# Patient Record
Sex: FEMALE | Race: WHITE | NOT HISPANIC OR LATINO
[De-identification: names, ages, dates, MRNs, and addresses within clinical notes are randomized per-mention and may not be internally consistent; named-entity substitution may affect disease eponyms.]

---

## 2018-07-06 ENCOUNTER — APPOINTMENT (OUTPATIENT)
Dept: HEART AND VASCULAR | Facility: CLINIC | Age: 83
End: 2018-07-06
Payer: MEDICARE

## 2018-07-06 ENCOUNTER — LABORATORY RESULT (OUTPATIENT)
Age: 83
End: 2018-07-06

## 2018-07-06 DIAGNOSIS — Z00.00 ENCOUNTER FOR GENERAL ADULT MEDICAL EXAMINATION W/OUT ABNORMAL FINDINGS: ICD-10-CM

## 2018-07-06 PROCEDURE — ZZZZZ: CPT

## 2018-07-10 LAB
APPEARANCE: ABNORMAL
BACTERIA UR CULT: NORMAL
BILIRUBIN URINE: NEGATIVE
BLOOD URINE: NEGATIVE
COLOR: YELLOW
GLUCOSE QUALITATIVE U: NEGATIVE MG/DL
KETONES URINE: NEGATIVE
LEUKOCYTE ESTERASE URINE: NEGATIVE
NITRITE URINE: NEGATIVE
PH URINE: >8.5
PROTEIN URINE: 30 MG/DL
SPECIFIC GRAVITY URINE: 1.01
UROBILINOGEN URINE: NEGATIVE MG/DL

## 2018-09-13 ENCOUNTER — RX RENEWAL (OUTPATIENT)
Age: 83
End: 2018-09-13

## 2018-09-13 DIAGNOSIS — F41.9 ANXIETY DISORDER, UNSPECIFIED: ICD-10-CM

## 2018-09-17 ENCOUNTER — APPOINTMENT (OUTPATIENT)
Dept: HEART AND VASCULAR | Facility: CLINIC | Age: 83
End: 2018-09-17
Payer: MEDICARE

## 2018-09-17 VITALS — RESPIRATION RATE: 12 BRPM | DIASTOLIC BLOOD PRESSURE: 86 MMHG | SYSTOLIC BLOOD PRESSURE: 146 MMHG | HEART RATE: 78 BPM

## 2018-09-17 DIAGNOSIS — Z86.69 PERSONAL HISTORY OF OTHER DISEASES OF THE NERVOUS SYSTEM AND SENSE ORGANS: ICD-10-CM

## 2018-09-17 DIAGNOSIS — R01.1 CARDIAC MURMUR, UNSPECIFIED: ICD-10-CM

## 2018-09-17 DIAGNOSIS — F41.1 GENERALIZED ANXIETY DISORDER: ICD-10-CM

## 2018-09-17 DIAGNOSIS — Z86.73 PERSONAL HISTORY OF TRANSIENT ISCHEMIC ATTACK (TIA), AND CEREBRAL INFARCTION W/OUT RESIDUAL DEFICITS: ICD-10-CM

## 2018-09-17 DIAGNOSIS — R09.89 OTHER SPECIFIED SYMPTOMS AND SIGNS INVOLVING THE CIRCULATORY AND RESPIRATORY SYSTEMS: ICD-10-CM

## 2018-09-17 DIAGNOSIS — Z87.898 PERSONAL HISTORY OF OTHER SPECIFIED CONDITIONS: ICD-10-CM

## 2018-09-17 DIAGNOSIS — Z78.9 OTHER SPECIFIED HEALTH STATUS: ICD-10-CM

## 2018-09-17 PROCEDURE — 99214 OFFICE O/P EST MOD 30 MIN: CPT

## 2018-09-17 PROCEDURE — 93306 TTE W/DOPPLER COMPLETE: CPT

## 2018-09-17 PROCEDURE — 93000 ELECTROCARDIOGRAM COMPLETE: CPT

## 2018-09-17 PROCEDURE — 93880 EXTRACRANIAL BILAT STUDY: CPT

## 2018-09-17 RX ORDER — FAMOTIDINE 40 MG/1
40 TABLET, FILM COATED ORAL
Refills: 0 | Status: ACTIVE | COMMUNITY

## 2018-09-18 LAB
25(OH)D3 SERPL-MCNC: 20.7 NG/ML
ALBUMIN SERPL ELPH-MCNC: 4.4 G/DL
ALP BLD-CCNC: 115 U/L
ALT SERPL-CCNC: 10 U/L
ANION GAP SERPL CALC-SCNC: 27 MMOL/L
AST SERPL-CCNC: 16 U/L
BASOPHILS # BLD AUTO: 0.01 K/UL
BASOPHILS NFR BLD AUTO: 0.1 %
BILIRUB SERPL-MCNC: 0.3 MG/DL
BUN SERPL-MCNC: 14 MG/DL
CALCIUM SERPL-MCNC: 9.5 MG/DL
CHLORIDE SERPL-SCNC: 94 MMOL/L
CHOLEST SERPL-MCNC: 300 MG/DL
CHOLEST/HDLC SERPL: 3.4 RATIO
CO2 SERPL-SCNC: 17 MMOL/L
CREAT SERPL-MCNC: 0.76 MG/DL
EOSINOPHIL # BLD AUTO: 0.05 K/UL
EOSINOPHIL NFR BLD AUTO: 0.5 %
FOLATE SERPL-MCNC: 15.9 NG/ML
GLUCOSE SERPL-MCNC: 99 MG/DL
HBA1C MFR BLD HPLC: 5.9 %
HCT VFR BLD CALC: 40.8 %
HDLC SERPL-MCNC: 88 MG/DL
HGB BLD-MCNC: 12.7 G/DL
IMM GRANULOCYTES NFR BLD AUTO: 0.1 %
LDLC SERPL CALC-MCNC: 195 MG/DL
LYMPHOCYTES # BLD AUTO: 1.44 K/UL
LYMPHOCYTES NFR BLD AUTO: 15.5 %
MAGNESIUM SERPL-MCNC: 2.1 MG/DL
MAN DIFF?: NORMAL
MCHC RBC-ENTMCNC: 29.1 PG
MCHC RBC-ENTMCNC: 31.1 GM/DL
MCV RBC AUTO: 93.6 FL
MONOCYTES # BLD AUTO: 0.55 K/UL
MONOCYTES NFR BLD AUTO: 5.9 %
NEUTROPHILS # BLD AUTO: 7.23 K/UL
NEUTROPHILS NFR BLD AUTO: 77.9 %
NT-PROBNP SERPL-MCNC: 768 PG/ML
PHOSPHATE SERPL-MCNC: 3.6 MG/DL
PLATELET # BLD AUTO: 216 K/UL
POTASSIUM SERPL-SCNC: 4.9 MMOL/L
PROT SERPL-MCNC: 7.4 G/DL
PSA SERPL-MCNC: <0.01 NG/ML
RBC # BLD: 4.36 M/UL
RBC # FLD: 14.8 %
SODIUM SERPL-SCNC: 138 MMOL/L
T3FREE SERPL-MCNC: 3.53 PG/ML
T4 FREE SERPL-MCNC: 1.2 NG/DL
T4 SERPL-MCNC: 7.5 UG/DL
TRIGL SERPL-MCNC: 85 MG/DL
TSH SERPL-ACNC: 0.28 UIU/ML
VIT B12 SERPL-MCNC: 533 PG/ML
WBC # FLD AUTO: 9.29 K/UL

## 2018-09-20 LAB
M TB IFN-G BLD-IMP: NEGATIVE
QUANTIFERON TB PLUS MITOGEN MINUS NIL: >10 IU/ML
QUANTIFERON TB PLUS NIL: 0.08 IU/ML
QUANTIFERON TB PLUS TB1 MINUS NIL: 0.02 IU/ML
QUANTIFERON TB PLUS TB2 MINUS NIL: 0.02 IU/ML

## 2018-10-30 ENCOUNTER — RX RENEWAL (OUTPATIENT)
Age: 83
End: 2018-10-30

## 2018-12-11 ENCOUNTER — RX RENEWAL (OUTPATIENT)
Age: 83
End: 2018-12-11

## 2019-02-01 ENCOUNTER — RX RENEWAL (OUTPATIENT)
Age: 84
End: 2019-02-01

## 2019-03-06 ENCOUNTER — RX RENEWAL (OUTPATIENT)
Age: 84
End: 2019-03-06

## 2019-03-06 DIAGNOSIS — E03.9 HYPOTHYROIDISM, UNSPECIFIED: ICD-10-CM

## 2019-04-25 ENCOUNTER — RX RENEWAL (OUTPATIENT)
Age: 84
End: 2019-04-25

## 2019-04-30 ENCOUNTER — RX CHANGE (OUTPATIENT)
Age: 84
End: 2019-04-30

## 2019-04-30 RX ORDER — THYROID, PORCINE 90 MG/1
90 TABLET ORAL DAILY
Qty: 90 | Refills: 3 | Status: DISCONTINUED | COMMUNITY
Start: 2019-04-30 | End: 2019-04-30

## 2019-04-30 RX ORDER — THYROID, PORCINE 90 MG/1
90 TABLET ORAL DAILY
Qty: 90 | Refills: 3 | Status: ACTIVE | COMMUNITY
Start: 2019-04-30 | End: 1900-01-01

## 2019-06-06 ENCOUNTER — RX RENEWAL (OUTPATIENT)
Age: 84
End: 2019-06-06

## 2019-06-06 RX ORDER — DIAZEPAM 2 MG/1
2 TABLET ORAL
Qty: 120 | Refills: 0 | Status: ACTIVE | COMMUNITY
Start: 2018-09-13 | End: 1900-01-01

## 2019-12-04 ENCOUNTER — APPOINTMENT (OUTPATIENT)
Dept: HEART AND VASCULAR | Facility: CLINIC | Age: 84
End: 2019-12-04
Payer: MEDICARE

## 2019-12-04 ENCOUNTER — NON-APPOINTMENT (OUTPATIENT)
Age: 84
End: 2019-12-04

## 2019-12-04 VITALS
RESPIRATION RATE: 15 BRPM | HEART RATE: 68 BPM | SYSTOLIC BLOOD PRESSURE: 140 MMHG | BODY MASS INDEX: 22.38 KG/M2 | WEIGHT: 114 LBS | DIASTOLIC BLOOD PRESSURE: 90 MMHG | HEIGHT: 60 IN

## 2019-12-04 DIAGNOSIS — R06.02 SHORTNESS OF BREATH: ICD-10-CM

## 2019-12-04 DIAGNOSIS — Z86.39 PERSONAL HISTORY OF OTHER ENDOCRINE, NUTRITIONAL AND METABOLIC DISEASE: ICD-10-CM

## 2019-12-04 DIAGNOSIS — Z86.79 PERSONAL HISTORY OF OTHER DISEASES OF THE CIRCULATORY SYSTEM: ICD-10-CM

## 2019-12-04 DIAGNOSIS — I35.0 NONRHEUMATIC AORTIC (VALVE) STENOSIS: ICD-10-CM

## 2019-12-04 PROCEDURE — 93000 ELECTROCARDIOGRAM COMPLETE: CPT

## 2019-12-04 PROCEDURE — 99215 OFFICE O/P EST HI 40 MIN: CPT

## 2019-12-09 PROBLEM — I35.0 AORTIC STENOSIS, SEVERE: Status: ACTIVE | Noted: 2019-12-04

## 2019-12-09 NOTE — PHYSICAL EXAM
[General Appearance - Well Developed] : well developed [Normal Appearance] : normal appearance [Well Groomed] : well groomed [General Appearance - Well Nourished] : well nourished [No Deformities] : no deformities [Normal Conjunctiva] : the conjunctiva exhibited no abnormalities [General Appearance - In No Acute Distress] : no acute distress [Eyelids - No Xanthelasma] : the eyelids demonstrated no xanthelasmas [No Oral Pallor] : no oral pallor [Normal Oral Mucosa] : normal oral mucosa [No Oral Cyanosis] : no oral cyanosis [Normal Jugular Venous V Waves Present] : normal jugular venous V waves present [Exaggerated Use Of Accessory Muscles For Inspiration] : no accessory muscle use [Auscultation Breath Sounds / Voice Sounds] : lungs were clear to auscultation bilaterally [Respiration, Rhythm And Depth] : normal respiratory rhythm and effort [Heart Sounds] : normal S1 and S2 [Heart Rate And Rhythm] : heart rate and rhythm were normal [Abdomen Soft] : soft [Abdomen Tenderness] : non-tender [Abdomen Mass (___ Cm)] : no abdominal mass palpated [Nail Clubbing] : no clubbing of the fingernails [] : no ischemic changes [Cyanosis, Localized] : no localized cyanosis [Petechial Hemorrhages (___cm)] : no petechial hemorrhages [FreeTextEntry1] : apical systolic murmur, diastolic murmur at the lower left sternal border, systolic murmur at the 2nd ICS, LSB

## 2019-12-09 NOTE — HISTORY OF PRESENT ILLNESS
[FreeTextEntry1] : The patient fractured her right hip.  Was taken to [facility].  They were reluctant to do surgery due to AS.  Prior to surgery, she had aortic valvuloplasty.  She has just recently been released from SNF & is in assisted living facility.  Now [facility] wants to consider TAVR.  Here for 2nd opinion.  She denies chest pain, dizziness or syncope or SOLORZANO.  In wheelchair.

## 2019-12-09 NOTE — DISCUSSION/SUMMARY
[FreeTextEntry1] : Long discussion with patient & daughter.  As she is currently asymptomatic from a cardiac standpoint, there is no urgency to consider TAVR.  She is to consult with Dr rodas , a cardiologist on [facility], & get an echocardiogram to assess her AS.  Then, depending upon the results of her echo , as well as symptoms, they can determine if & when  TAVR may be indicated.  Maintain on curent medical regimen for now.

## 2019-12-09 NOTE — REVIEW OF SYSTEMS
[Feeling Fatigued] : feeling fatigued [Heartburn] : heartburn [Eyeglasses] : currently wearing eyeglasses [Hip Pain] : hip pain [Joint Pain] : joint pain [Incontinence] : incontinence [Knee Problem] : knee problems [Knee Pain] : knee pain [Memory Lapses Or Loss] : memory lapses or loss [Anxiety] : anxiety [Under Stress] : under stress [Negative] : ENT [Headache] : no headache [Blurry Vision] : no blurred vision [Seeing Double (Diplopia)] : no diplopia [Abdominal Pain] : no abdominal pain [Dysphagia] : no dysphagia

## 2022-04-09 ENCOUNTER — APPOINTMENT (OUTPATIENT)
Dept: ORTHOPEDIC SURGERY | Facility: CLINIC | Age: 87
End: 2022-04-09
Payer: MEDICARE

## 2022-04-09 VITALS — WEIGHT: 114 LBS | BODY MASS INDEX: 19.46 KG/M2 | HEIGHT: 64 IN

## 2022-04-09 PROCEDURE — 99213 OFFICE O/P EST LOW 20 MIN: CPT | Mod: 25

## 2022-04-09 PROCEDURE — 20610 DRAIN/INJ JOINT/BURSA W/O US: CPT

## 2022-04-09 PROCEDURE — J3490M: CUSTOM

## 2022-04-09 NOTE — PHYSICAL EXAM
[Bilateral] : knee bilaterally [FreeTextEntry3] : mild effusion, global tenderness.\par ROM 0-100\par MJL, LJL tenderness\par ligament stable\par using wheelchair

## 2022-04-09 NOTE — PROCEDURE
[FreeTextEntry3] : Injection Procedure Note:\par \par Patient Identification\par Name/: Verbal with patient and/or family\par \par Procedure Verification:\par Procedure confirmed with patient or family/designee\par Consent for procedure: Verbal Consent Given\par Relevant documentation completed, reviewed, and signed\par Clinical indications for procedure confirmed\par \par \par \par Time-out with all members of procedure team immediately prior to procedure:\par Correct patient identified. Agreement on procedure. Correct side and site.\par \par \par \par KNEE INJECTION (STEROID) - LEFT\par After verbal consent and identification of the correct patient and correct site, the superolateral left knee was prepped using alcohol swabs and betadine. This was allowed time to air dry. A mixture of 1cc DepoMedrol 40mg/ml, 3cc Lidocaine 1%, and 3cc Bupivacaine 0.5% was injected into the suprapatellar pouch using a sterile 22G needle after ethyl chloride spray for skin anesthesia. The patient tolerated the procedure well. After-care instructions were provided and included instructions to ice the area and to call if redness, pain, or fever develop.\par \par Injection Procedure Note:\par  \par Patient Identification\par Name/: Verbal with patient and/or family\par  \par Procedure Verification:\par Procedure confirmed with patient or family/designee\par Consent for procedure: Verbal Consent Given\par Relevant documentation completed, reviewed, and signed\par Clinical indications for procedure confirmed\par \par  \par \par Time-out with all members of procedure team immediately prior to procedure:\par Correct patient identified. Agreement on procedure. Correct side and site.\par \par  \par \par KNEE INJECTION (STEROID) - RIGHT\par After verbal consent and identification of the correct patient and correct site, the superolateral right knee was prepped using alcohol swabs and betadine. This was allowed time to air dry. A mixture of 1cc DepoMedrol 40mg/ml, 3cc Lidocaine 1%, and 3cc Bupivacaine 0.5% was injected into the suprapatellar pouch using a sterile 22G needle after ethyl chloride spray for skin anesthesia. The patient tolerated the procedure well. After-care instructions were provided and included instructions to ice the area and to call if redness, pain, or fever develop.\par

## 2022-04-09 NOTE — HISTORY OF PRESENT ILLNESS
[Gradual] : gradual [10] : 10 [Dull/Aching] : dull/aching [Localized] : localized [Constant] : constant [Standing] : standing [Walking] : walking [Stairs] : stairs [Retired] : Work status: retired [de-identified] : B knee follow up. Hx Knee OA. Treated with CSI in the past. Last one over 3 months ago. No new falls. Using a wheelchair\par \par  [] : no [FreeTextEntry1] : liat knees  [FreeTextEntry5] : patient is here with knee pain since for many years \par patient has been getting cortisone injections every three years

## 2022-04-11 ENCOUNTER — APPOINTMENT (OUTPATIENT)
Dept: ORTHOPEDIC SURGERY | Facility: CLINIC | Age: 87
End: 2022-04-11

## 2022-07-11 ENCOUNTER — APPOINTMENT (OUTPATIENT)
Dept: ORTHOPEDIC SURGERY | Facility: CLINIC | Age: 87
End: 2022-07-11

## 2022-07-11 PROCEDURE — J3490M: CUSTOM

## 2022-07-11 PROCEDURE — 20610 DRAIN/INJ JOINT/BURSA W/O US: CPT | Mod: 50

## 2022-07-11 PROCEDURE — 99213 OFFICE O/P EST LOW 20 MIN: CPT | Mod: 25

## 2022-07-11 NOTE — IMAGING
[de-identified] : Bilateral knees with mild effusion.\par Examination is limited as pt is in a wheelchair.\par There is no ligamentous laxity to either knee.\par There is lateral jointline ttp symmetrically.\par BLEs are nvi with 4/5 strength.

## 2022-07-11 NOTE — HISTORY OF PRESENT ILLNESS
[de-identified] : 7/11/2022: Pt here for bilateral knee CSI's, Ms. Dewitt has had previous Viscosupplementation injections with no pain relief and she states she requires a cortisone injection every 3 months for pain relief.\par Ms. Dewitt declines xrays today. \par 12/6/21: Continued pain to right knee, and reports pain has been worsening. Hx of relief with csi\par in past. \par 8/16/21: pt having persistent knee pain. pt is in wheelchair. \par 8/09/21: bilateral knee pain persists- pt is interested in repeat csi's today\par 4/19/21:Pt here for f/u of bilateral knee osteoarthritis. Pt is requesting bilateral knee CSIs today.\par 9/14/2020: Pt here for f/u of bilateral knee osteoarthritis. Pt is requesting bilateral knee CSI' today. \par 5/21/20: unclear whether either injection helped. \par 2/13/2020: Pt. here for Orthovisc injection #4 to the bilateral knees. Pt. reports mild pain relief. \par 2/6/2020: Pt. here for Orthovisc injection #3 to the bilateral knees. \par 1/30/20: injections with min change in the knees\par 1/23/2020: Pt. here for the first of four injections of Orthovisc to the bilateral knees for treatment of knee OA. \par 12/3/19: fell and broke her hip in August- she went to [facility] and was told she couldn't have surgery because she\par needed a valvoplasty- was sent to [facility] and had the valvoplasty and hip surgery there. her knees were badly\par affected by the injury/fall and therefore couldn't walk- went to rehab for 3 mos. was walking 90 feet with a walker and\par is walking, but not the way she used to walk. also has a 24h aide. here for knee injections. hasn't followed up for the\par hip at all. \par 7/11/2019: Pt. here for f/u of bilateral knee OA.\par 4/11/19 b/l knee, R>L\par 12/20/18 bilateral knee pain and swelling doing PT/OT helps started after a TIA 1 year ago

## 2022-07-11 NOTE — ASSESSMENT
[FreeTextEntry1] : Pt provided bilateral knee CSI.\par RTO in 3 mos for repeat cortisone injections.\par Pt will rto prn.

## 2022-07-11 NOTE — PROCEDURE
[Large Joint Injection] : Large joint injection [Bilateral] : bilaterally of the [Knee] : knee [Pain] : pain [Inflammation] : inflammation [X-ray evidence of Osteoarthritis on this or prior visit] : x-ray evidence of Osteoarthritis on this or prior visit [Alcohol] : alcohol [Ethyl Chloride sprayed topically] : ethyl chloride sprayed topically [Sterile technique used] : sterile technique used [___ cc    1%] : Lidocaine ~Vcc of 1%  [___ cc    0.25%] : Bupivacaine (Marcaine) ~Vcc of 0.25%  [___ cc    40mg] : Triamcinolone (Kenalog) ~Vcc of 40 mg  [] : Patient tolerated procedure well [Call if redness, pain or fever occur] : call if redness, pain or fever occur [Apply ice for 15min out of every hour for the next 12-24 hours as tolerated] : apply ice for 15 minutes out of every hour for the next 12-24 hours as tolerated [Previous OTC use and PT nontherapeutic] : patient has tried OTC's including aspirin, Ibuprofen, Aleve, etc or prescription NSAIDS, and/or exercises at home and/or physical therapy without satisfactory response [Patient had decreased mobility in the joint] : patient had decreased mobility in the joint [Risks, benefits, alternatives discussed / Verbal consent obtained] : the risks benefits, and alternatives have been discussed, and verbal consent was obtained [de-identified] : Bilateral knee OA.

## 2022-10-13 ENCOUNTER — APPOINTMENT (OUTPATIENT)
Dept: ORTHOPEDIC SURGERY | Facility: CLINIC | Age: 87
End: 2022-10-13

## 2022-10-13 VITALS — WEIGHT: 114 LBS | BODY MASS INDEX: 19.46 KG/M2 | HEIGHT: 64 IN

## 2022-10-13 PROCEDURE — 99213 OFFICE O/P EST LOW 20 MIN: CPT | Mod: 25

## 2022-10-13 PROCEDURE — 20610 DRAIN/INJ JOINT/BURSA W/O US: CPT | Mod: 50

## 2022-10-13 NOTE — HISTORY OF PRESENT ILLNESS
[de-identified] : 10/13/2022: Pt here for f/u of bilateral chronic knee OA. \par pt notes mild pain relief s/p CSIs and states she is not a candidate for TKAs due to "heart issues".\par \par 7/11/2022: Pt here for bilateral knee CSI's, Ms. Dewitt has had previous Viscosupplementation injections with no pain relief and she states she requires a cortisone injection every 3 months for pain relief.\par Ms. Dewitt declines xrays today. \par 12/6/21: Continued pain to right knee, and reports pain has been worsening. Hx of relief with csi\par in past. \par 8/16/21: pt having persistent knee pain. pt is in wheelchair. \par 8/09/21: bilateral knee pain persists- pt is interested in repeat csi's today\par 4/19/21:Pt here for f/u of bilateral knee osteoarthritis. Pt is requesting bilateral knee CSIs today.\par 9/14/2020: Pt here for f/u of bilateral knee osteoarthritis. Pt is requesting bilateral knee CSI' today. \par 5/21/20: unclear whether either injection helped. \par 2/13/2020: Pt. here for Orthovisc injection #4 to the bilateral knees. Pt. reports mild pain relief. \par 2/6/2020: Pt. here for Orthovisc injection #3 to the bilateral knees. \par 1/30/20: injections with min change in the knees\par 1/23/2020: Pt. here for the first of four injections of Orthovisc to the bilateral knees for treatment of knee OA. \par 12/3/19: fell and broke her hip in August- she went to [facility] and was told she couldn't have surgery because she\par needed a valvoplasty- was sent to [facility] and had the valvoplasty and hip surgery there. her knees were badly\par affected by the injury/fall and therefore couldn't walk- went to rehab for 3 mos. was walking 90 feet with a walker and\par is walking, but not the way she used to walk. also has a 24h aide. here for knee injections. hasn't followed up for the\par hip at all. \par 7/11/2019: Pt. here for f/u of bilateral knee OA.\par 4/11/19 b/l knee, R>L\par 12/20/18 bilateral knee pain and swelling doing PT/OT helps started after a TIA 1 year ago

## 2022-10-13 NOTE — IMAGING
[de-identified] : Bilateral knees with mild effusion.\par Examination is limited as pt is in a wheelchair.\par There is no ligamentous laxity to either knee.\par There is lateral jointline ttp symmetrically.\par BLEs are nvi with 4/5 strength.

## 2022-10-13 NOTE — PROCEDURE
[Large Joint Injection] : Large joint injection [Bilateral] : bilaterally of the [Knee] : knee [Pain] : pain [Inflammation] : inflammation [X-ray evidence of Osteoarthritis on this or prior visit] : x-ray evidence of Osteoarthritis on this or prior visit [Alcohol] : alcohol [Ethyl Chloride sprayed topically] : ethyl chloride sprayed topically [Sterile technique used] : sterile technique used [___ cc    1%] : Lidocaine ~Vcc of 1%  [___ cc    0.25%] : Bupivacaine (Marcaine) ~Vcc of 0.25%  [___ cc    40mg] : Triamcinolone (Kenalog) ~Vcc of 40 mg  [] : Patient tolerated procedure well [Call if redness, pain or fever occur] : call if redness, pain or fever occur [Apply ice for 15min out of every hour for the next 12-24 hours as tolerated] : apply ice for 15 minutes out of every hour for the next 12-24 hours as tolerated [Previous OTC use and PT nontherapeutic] : patient has tried OTC's including aspirin, Ibuprofen, Aleve, etc or prescription NSAIDS, and/or exercises at home and/or physical therapy without satisfactory response [Patient had decreased mobility in the joint] : patient had decreased mobility in the joint [Risks, benefits, alternatives discussed / Verbal consent obtained] : the risks benefits, and alternatives have been discussed, and verbal consent was obtained [de-identified] : Bilateral knee OA.

## 2022-10-13 NOTE — ASSESSMENT
[FreeTextEntry1] : Pt provided bilateral knee CSI.\par Pt will consider RTO in 3 mos for repeat cortisone injections.\par Possibility of Zirletta injections discussed.\par Will obtain new xrays of the bilateral knees next visit as previous xrays were performed in 2018.

## 2023-01-09 ENCOUNTER — APPOINTMENT (OUTPATIENT)
Dept: ORTHOPEDIC SURGERY | Facility: CLINIC | Age: 88
End: 2023-01-09
Payer: MEDICARE

## 2023-01-09 VITALS — HEIGHT: 64 IN | BODY MASS INDEX: 19.46 KG/M2 | WEIGHT: 114 LBS

## 2023-01-09 PROCEDURE — J3490N: CUSTOM | Mod: NC

## 2023-01-09 PROCEDURE — 99214 OFFICE O/P EST MOD 30 MIN: CPT | Mod: 25

## 2023-01-09 PROCEDURE — 20610 DRAIN/INJ JOINT/BURSA W/O US: CPT | Mod: 50

## 2023-01-09 NOTE — HISTORY OF PRESENT ILLNESS
[de-identified] : 1/9/23: Pt here for f/u of bilateral chronic knee OA.\par \par 10/13/2022: Pt here for f/u of bilateral chronic knee OA. \par pt notes mild pain relief s/p CSIs and states she is not a candidate for TKAs due to "heart issues".\par \par 7/11/2022: Pt here for bilateral knee CSI's, Ms. Dewitt has had previous Viscosupplementation injections with no pain relief and she states she requires a cortisone injection every 3 months for pain relief.\par Ms. Dewitt declines xrays today. \par 12/6/21: Continued pain to right knee, and reports pain has been worsening. Hx of relief with csi\par in past. \par 8/16/21: pt having persistent knee pain. pt is in wheelchair. \par 8/09/21: bilateral knee pain persists- pt is interested in repeat csi's today\par 4/19/21:Pt here for f/u of bilateral knee osteoarthritis. Pt is requesting bilateral knee CSIs today.\par 9/14/2020: Pt here for f/u of bilateral knee osteoarthritis. Pt is requesting bilateral knee CSI' today. \par 5/21/20: unclear whether either injection helped. \par 2/13/2020: Pt. here for Orthovisc injection #4 to the bilateral knees. Pt. reports mild pain relief. \par 2/6/2020: Pt. here for Orthovisc injection #3 to the bilateral knees. \par 1/30/20: injections with min change in the knees\par 1/23/2020: Pt. here for the first of four injections of Orthovisc to the bilateral knees for treatment of knee OA. \par 12/3/19: fell and broke her hip in August- she went to [facility] and was told she couldn't have surgery because she\par needed a valvoplasty- was sent to [facility] and had the valvoplasty and hip surgery there. her knees were badly\par affected by the injury/fall and therefore couldn't walk- went to rehab for 3 mos. was walking 90 feet with a walker and\par is walking, but not the way she used to walk. also has a 24h aide. here for knee injections. hasn't followed up for the\par hip at all. \par 7/11/2019: Pt. here for f/u of bilateral knee OA.\par 4/11/19 b/l knee, R>L\par 12/20/18 bilateral knee pain and swelling doing PT/OT helps started after a TIA 1 year ago

## 2023-01-09 NOTE — IMAGING
[de-identified] : Bilateral knees with mild effusion.\par Examination is limited as pt is in a wheelchair.\par There is no ligamentous laxity to either knee.\par There is lateral jointline ttp symmetrically.\par BLEs are nvi with 4/5 strength.

## 2023-01-09 NOTE — ASSESSMENT
[FreeTextEntry1] : The patient was advised of the diagnosis. The natural history of the pathology was explained in full to the patient in layman's terms. All questions were answered. The risks and benefits of surgical and non-surgical treatment alternatives were explained in full to the patient.\par \par Large joint injection of the b/l knees were performed. The indication for this procedure was pain, inflammation and x-ray evidence of Osteoarthritis on this or prior visit. The site was prepped with alcohol, ethyl chloride sprayed topically and sterile technique used. An injection of Lidocaine 3cc of 2% , Bupivacaine (Marcaine) 3cc of 0.5% , Triamcinolone (Kenalog) 2cc of 40 mg  was used.   Patient tolerated procedure well. Patient was advised to call if redness, pain or fever occur and apply ice for 15 minutes out of every hour for the next 12-24 hours as tolerated. The risks benefits, and alternatives have been discussed, and verbal consent was obtained.\par \par Pt was given CSI in b/l knees today.\par Pt will consider gel injections if CSI provides no relief.

## 2023-01-30 ENCOUNTER — APPOINTMENT (OUTPATIENT)
Dept: ORTHOPEDIC SURGERY | Facility: CLINIC | Age: 88
End: 2023-01-30
Payer: MEDICARE

## 2023-01-30 VITALS — BODY MASS INDEX: 19.46 KG/M2 | HEIGHT: 64 IN | WEIGHT: 114 LBS

## 2023-01-30 DIAGNOSIS — M17.11 UNILATERAL PRIMARY OSTEOARTHRITIS, RIGHT KNEE: ICD-10-CM

## 2023-01-30 DIAGNOSIS — M17.12 UNILATERAL PRIMARY OSTEOARTHRITIS, LEFT KNEE: ICD-10-CM

## 2023-01-30 PROCEDURE — 20610 DRAIN/INJ JOINT/BURSA W/O US: CPT | Mod: 50

## 2023-01-30 PROCEDURE — 99214 OFFICE O/P EST MOD 30 MIN: CPT | Mod: 25

## 2023-01-30 NOTE — ASSESSMENT
[FreeTextEntry1] : The patient was advised of the diagnosis. The natural history of the pathology was explained in full to the patient in layman's terms. All questions were answered. The risks and benefits of surgical and non-surgical treatment alternatives were explained in full to the patient.\par \par Large joint injection of the b/l knees were performed . The indication for this procedure was pain, inflammation and x-ray evidence of Osteoarthritis on this or prior visit. The site was prepped with alcohol, ethyl chloride sprayed topically and sterile technique used. An injection of  was used.   Patient tolerated procedure well. Patient was advised to call if redness, pain or fever occur and apply ice for 15 minutes out of every hour for the next 12-24 hours as tolerated. The risks benefits, and alternatives have been discussed, and verbal consent was obtained.\par \par Pt was given Synvisc #1 in b/l knees today

## 2023-01-30 NOTE — HISTORY OF PRESENT ILLNESS
[de-identified] : 1/30/22: Bilateral knees improved somewhat with csi, but still persists. \par \par 1/9/23: Pt here for f/u of bilateral chronic knee OA.\par \par 10/13/2022: Pt here for f/u of bilateral chronic knee OA. \par pt notes mild pain relief s/p CSIs and states she is not a candidate for TKAs due to "heart issues".\par \par 7/11/2022: Pt here for bilateral knee CSI's, Ms. Dewitt has had previous Viscosupplementation injections with no pain relief and she states she requires a cortisone injection every 3 months for pain relief.\par Ms. Dewitt declines xrays today. \par 12/6/21: Continued pain to right knee, and reports pain has been worsening. Hx of relief with csi\par in past. \par 8/16/21: pt having persistent knee pain. pt is in wheelchair. \par 8/09/21: bilateral knee pain persists- pt is interested in repeat csi's today\par 4/19/21:Pt here for f/u of bilateral knee osteoarthritis. Pt is requesting bilateral knee CSIs today.\par 9/14/2020: Pt here for f/u of bilateral knee osteoarthritis. Pt is requesting bilateral knee CSI' today. \par 5/21/20: unclear whether either injection helped. \par 2/13/2020: Pt. here for Orthovisc injection #4 to the bilateral knees. Pt. reports mild pain relief. \par 2/6/2020: Pt. here for Orthovisc injection #3 to the bilateral knees. \par 1/30/20: injections with min change in the knees\par 1/23/2020: Pt. here for the first of four injections of Orthovisc to the bilateral knees for treatment of knee OA. \par 12/3/19: fell and broke her hip in August- she went to [facility] and was told she couldn't have surgery because she\par needed a valvoplasty- was sent to [facility] and had the valvoplasty and hip surgery there. her knees were badly\par affected by the injury/fall and therefore couldn't walk- went to rehab for 3 mos. was walking 90 feet with a walker and\par is walking, but not the way she used to walk. also has a 24h aide. here for knee injections. hasn't followed up for the\par hip at all. \par 7/11/2019: Pt. here for f/u of bilateral knee OA.\par 4/11/19 b/l knee, R>L\par 12/20/18 bilateral knee pain and swelling doing PT/OT helps started after a TIA 1 year ago [] : Post Surgical Visit: no [FreeTextEntry1] : bilateral knees  [de-identified] : none

## 2023-01-30 NOTE — IMAGING
[de-identified] : Bilateral knees with mild effusion.\par Examination is limited as pt is in a wheelchair.\par There is no ligamentous laxity to either knee.\par There is lateral jointline ttp symmetrically.\par BLEs are nvi with 4/5 strength.  [Bilateral] : knee bilaterally [FreeTextEntry9] : djd bilateral knees

## 2023-04-10 ENCOUNTER — APPOINTMENT (OUTPATIENT)
Dept: ORTHOPEDIC SURGERY | Facility: CLINIC | Age: 88
End: 2023-04-10